# Patient Record
Sex: FEMALE | Race: BLACK OR AFRICAN AMERICAN | URBAN - METROPOLITAN AREA
[De-identification: names, ages, dates, MRNs, and addresses within clinical notes are randomized per-mention and may not be internally consistent; named-entity substitution may affect disease eponyms.]

---

## 2023-04-13 ENCOUNTER — WEB ENCOUNTER (OUTPATIENT)
Dept: URBAN - METROPOLITAN AREA CLINIC 25 | Facility: CLINIC | Age: 33
End: 2023-04-13

## 2023-04-18 ENCOUNTER — OFFICE VISIT (OUTPATIENT)
Dept: URBAN - METROPOLITAN AREA CLINIC 25 | Facility: CLINIC | Age: 33
End: 2023-04-18
Payer: MEDICAID

## 2023-04-18 ENCOUNTER — TELEPHONE ENCOUNTER (OUTPATIENT)
Dept: URBAN - METROPOLITAN AREA CLINIC 25 | Facility: CLINIC | Age: 33
End: 2023-04-18

## 2023-04-18 ENCOUNTER — WEB ENCOUNTER (OUTPATIENT)
Dept: URBAN - METROPOLITAN AREA CLINIC 25 | Facility: CLINIC | Age: 33
End: 2023-04-18

## 2023-04-18 VITALS
DIASTOLIC BLOOD PRESSURE: 69 MMHG | BODY MASS INDEX: 24.32 KG/M2 | HEIGHT: 65 IN | WEIGHT: 146 LBS | SYSTOLIC BLOOD PRESSURE: 98 MMHG | TEMPERATURE: 97.7 F | HEART RATE: 69 BPM

## 2023-04-18 DIAGNOSIS — R14.0 BLOATING SYMPTOM: ICD-10-CM

## 2023-04-18 DIAGNOSIS — K90.49 FOOD INTOLERANCE IN ADULT: ICD-10-CM

## 2023-04-18 DIAGNOSIS — K64.9 HEMORRHOIDS, UNSPECIFIED HEMORRHOID TYPE: ICD-10-CM

## 2023-04-18 DIAGNOSIS — R10.84 GENERALIZED ABDOMINAL PAIN: ICD-10-CM

## 2023-04-18 PROBLEM — 235719002: Status: ACTIVE | Noted: 2023-04-18

## 2023-04-18 PROCEDURE — 99204 OFFICE O/P NEW MOD 45 MIN: CPT | Performed by: INTERNAL MEDICINE

## 2023-04-18 RX ORDER — HYOSCYAMINE SULFATE 0.12 MG/1
TAKE 1 TABLET (0.125 MG) BY ORAL ROUTE 3 TIMES PER DAY FOR 30 DAYS TABLET ORAL
Qty: 90 TABLET | Refills: 1 | OUTPATIENT
Start: 2023-04-18 | End: 2023-10-15

## 2023-04-18 NOTE — HPI-TODAY'S VISIT:
31 yo pt presents with c/o abdominal pain with bloating/gas. Some improvement with famotidine. Pt also c/o occasional hemorrhoidal issues post pregnancy.

## 2023-05-01 ENCOUNTER — TELEPHONE ENCOUNTER (OUTPATIENT)
Dept: URBAN - METROPOLITAN AREA CLINIC 84 | Facility: CLINIC | Age: 33
End: 2023-05-01

## 2023-05-01 ENCOUNTER — OFFICE VISIT (OUTPATIENT)
Dept: URBAN - METROPOLITAN AREA SURGERY CENTER 20 | Facility: SURGERY CENTER | Age: 33
End: 2023-05-01
Payer: MEDICAID

## 2023-05-01 DIAGNOSIS — K29.60 ADENOPAPILLOMATOSIS GASTRICA: ICD-10-CM

## 2023-05-01 DIAGNOSIS — B96.81 BACTERIAL INFECTION DUE TO H. PYLORI: ICD-10-CM

## 2023-05-01 PROCEDURE — 43239 EGD BIOPSY SINGLE/MULTIPLE: CPT | Performed by: INTERNAL MEDICINE

## 2023-05-01 PROCEDURE — G8907 PT DOC NO EVENTS ON DISCHARG: HCPCS | Performed by: INTERNAL MEDICINE

## 2023-05-01 RX ORDER — HYOSCYAMINE SULFATE 0.12 MG/1
TAKE 1 TABLET (0.125 MG) BY ORAL ROUTE 3 TIMES PER DAY FOR 30 DAYS TABLET ORAL
Qty: 90 TABLET | Refills: 1 | Status: ACTIVE | COMMUNITY
Start: 2023-04-18 | End: 2023-10-15

## 2023-05-18 ENCOUNTER — TELEPHONE ENCOUNTER (OUTPATIENT)
Dept: URBAN - METROPOLITAN AREA CLINIC 84 | Facility: CLINIC | Age: 33
End: 2023-05-18

## 2023-05-18 RX ORDER — BISMUTH SUBSALICYLATE 262 MG/1
2 TABLETS WITH MEALS AND AT BEDTIME TABLET, CHEWABLE ORAL
Qty: 112 TABLET | Refills: 0 | OUTPATIENT
Start: 2023-05-18 | End: 2023-06-01

## 2023-05-18 RX ORDER — TETRACYCLINE HYDROCHLORIDE 500 MG/1
1 CAPSULE ON AN EMPTY STOMACH CAPSULE ORAL
Qty: 28 CAPSULE | Refills: 0 | OUTPATIENT
Start: 2023-05-18 | End: 2023-06-01

## 2023-05-18 RX ORDER — LANSOPRAZOLE 30 MG/1
1 CAPSULE CAPSULE, DELAYED RELEASE ORAL TWICE A DAY
Qty: 28 CAPSULE | Refills: 0 | OUTPATIENT
Start: 2023-05-18

## 2023-05-18 RX ORDER — METRONIDAZOLE 250 MG/1
1 TABLET TABLET ORAL THREE TIMES A DAY
Qty: 42 TABLET | Refills: 0 | OUTPATIENT
Start: 2023-05-18 | End: 2023-06-01

## 2023-06-19 ENCOUNTER — TELEPHONE ENCOUNTER (OUTPATIENT)
Dept: URBAN - METROPOLITAN AREA CLINIC 84 | Facility: CLINIC | Age: 33
End: 2023-06-19

## 2023-06-19 ENCOUNTER — DASHBOARD ENCOUNTERS (OUTPATIENT)
Age: 33
End: 2023-06-19

## 2023-06-19 ENCOUNTER — OFFICE VISIT (OUTPATIENT)
Dept: URBAN - METROPOLITAN AREA CLINIC 84 | Facility: CLINIC | Age: 33
End: 2023-06-19
Payer: MEDICAID

## 2023-06-19 VITALS
HEIGHT: 65 IN | DIASTOLIC BLOOD PRESSURE: 68 MMHG | WEIGHT: 146.4 LBS | SYSTOLIC BLOOD PRESSURE: 102 MMHG | HEART RATE: 80 BPM | TEMPERATURE: 98.1 F | BODY MASS INDEX: 24.39 KG/M2

## 2023-06-19 DIAGNOSIS — R10.84 GENERALIZED ABDOMINAL PAIN: ICD-10-CM

## 2023-06-19 DIAGNOSIS — R14.0 BLOATING SYMPTOM: ICD-10-CM

## 2023-06-19 DIAGNOSIS — K90.49 FOOD INTOLERANCE IN ADULT: ICD-10-CM

## 2023-06-19 DIAGNOSIS — K29.70 GASTRITIS, UNSPECIFIED, WITHOUT BLEEDING: ICD-10-CM

## 2023-06-19 DIAGNOSIS — B96.81 HELICOBACTER PYLORI [H. PYLORI] AS THE CAUSE OF DISEASES CLASSIFIED ELSEWHERE: ICD-10-CM

## 2023-06-19 PROCEDURE — 99213 OFFICE O/P EST LOW 20 MIN: CPT | Performed by: INTERNAL MEDICINE

## 2023-06-19 RX ORDER — HYOSCYAMINE SULFATE 0.12 MG/1
TAKE 1 TABLET (0.125 MG) BY ORAL ROUTE 3 TIMES PER DAY FOR 30 DAYS TABLET ORAL
Qty: 90 TABLET | Refills: 1 | Status: ACTIVE | COMMUNITY
Start: 2023-04-18 | End: 2023-10-15

## 2023-06-19 RX ORDER — LANSOPRAZOLE 30 MG/1
1 CAPSULE CAPSULE, DELAYED RELEASE ORAL TWICE A DAY
Qty: 28 CAPSULE | Refills: 0 | Status: ACTIVE | COMMUNITY
Start: 2023-05-18

## 2023-06-28 PROBLEM — 248490000: Status: ACTIVE | Noted: 2023-06-28

## 2023-06-28 PROBLEM — 708164002: Status: ACTIVE | Noted: 2023-06-28
